# Patient Record
Sex: MALE | Race: WHITE | NOT HISPANIC OR LATINO | Employment: OTHER | URBAN - METROPOLITAN AREA
[De-identification: names, ages, dates, MRNs, and addresses within clinical notes are randomized per-mention and may not be internally consistent; named-entity substitution may affect disease eponyms.]

---

## 2024-11-29 ENCOUNTER — TELEPHONE (OUTPATIENT)
Dept: ORTHOPEDICS | Facility: CLINIC | Age: 74
End: 2024-11-29
Payer: MEDICARE

## 2024-11-29 DIAGNOSIS — S72.001D HIP FX, RIGHT, CLOSED, WITH ROUTINE HEALING, SUBSEQUENT ENCOUNTER: Primary | ICD-10-CM

## 2024-11-29 DIAGNOSIS — M25.561 RIGHT KNEE PAIN, UNSPECIFIED CHRONICITY: ICD-10-CM

## 2024-11-29 NOTE — TELEPHONE ENCOUNTER
----- Message from Laura sent at 11/27/2024  4:30 PM CST -----  Type:  Needs Medical Advice    Who Called: KADE WASHINGTON [[id number]]  Symptoms (please be specific):    How long has patient had these symptoms:    Pharmacy name and phone #:    Would the patient rather a call back or a response via [facility]?   Best Call Back Number:  [phone]  Additional Information: Patient called in regards to appt schedule on 12- that was cancelled and appt reschedule for 12-04. Patient states 12-04 doesn't work for patient, he'll be out of town. Patient Will like to be  rescheduled after 12-10. Please call before scheduling

## 2024-11-29 NOTE — TELEPHONE ENCOUNTER
----- Message from Kiah sent at 11/29/2024 11:30 AM CST -----  Contact: Leroy  Type:  Patient Returning Call    Who Called:Leroy  Who Left Message for Patient:Nurse  Does the patient know what this is regarding?:rescheduling  Would the patient rather a call back or a response via [facility]? call  Best Call Back Number:[phone]    Additional Information:

## 2025-01-03 DIAGNOSIS — M25.561 RIGHT KNEE PAIN, UNSPECIFIED CHRONICITY: Primary | ICD-10-CM

## 2025-01-07 ENCOUNTER — HOSPITAL ENCOUNTER (OUTPATIENT)
Dept: RADIOLOGY | Facility: HOSPITAL | Age: 75
Discharge: HOME | End: 2025-01-07
Attending: ORTHOPAEDIC SURGERY
Payer: MEDICARE

## 2025-01-07 ENCOUNTER — OFFICE VISIT (OUTPATIENT)
Dept: ORTHOPEDICS | Facility: CLINIC | Age: 75
End: 2025-01-07
Payer: MEDICARE

## 2025-01-07 VITALS — BODY MASS INDEX: 20.88 KG/M2 | HEIGHT: 67 IN | WEIGHT: 133 LBS

## 2025-01-07 DIAGNOSIS — S72.001D CLOSED FRACTURE OF NECK OF RIGHT FEMUR WITH ROUTINE HEALING, SUBSEQUENT ENCOUNTER: ICD-10-CM

## 2025-01-07 DIAGNOSIS — M25.561 RIGHT KNEE PAIN, UNSPECIFIED CHRONICITY: ICD-10-CM

## 2025-01-07 DIAGNOSIS — S72.001D HIP FX, RIGHT, CLOSED, WITH ROUTINE HEALING, SUBSEQUENT ENCOUNTER: ICD-10-CM

## 2025-01-07 DIAGNOSIS — M17.11 PRIMARY OSTEOARTHRITIS OF RIGHT KNEE: Primary | ICD-10-CM

## 2025-01-07 PROCEDURE — 99213 OFFICE O/P EST LOW 20 MIN: CPT | Mod: S$PBB,,, | Performed by: ORTHOPAEDIC SURGERY

## 2025-01-07 PROCEDURE — 73502 X-RAY EXAM HIP UNI 2-3 VIEWS: CPT | Mod: TC,RT

## 2025-01-07 PROCEDURE — 99999 PR PBB SHADOW E&M-EST. PATIENT-LVL III: CPT | Mod: PBBFAC,,, | Performed by: ORTHOPAEDIC SURGERY

## 2025-01-07 PROCEDURE — 73502 X-RAY EXAM HIP UNI 2-3 VIEWS: CPT | Mod: 26,RT,, | Performed by: RADIOLOGY

## 2025-01-07 PROCEDURE — 99213 OFFICE O/P EST LOW 20 MIN: CPT | Mod: PBBFAC,25 | Performed by: ORTHOPAEDIC SURGERY

## 2025-01-07 PROCEDURE — 73564 X-RAY EXAM KNEE 4 OR MORE: CPT | Mod: TC,RT

## 2025-01-07 PROCEDURE — 73564 X-RAY EXAM KNEE 4 OR MORE: CPT | Mod: 26,RT,, | Performed by: RADIOLOGY

## 2025-01-07 RX ORDER — ERGOCALCIFEROL 1.25 MG/1
50000 CAPSULE ORAL
COMMUNITY

## 2025-01-07 RX ORDER — VITAMIN B COMPLEX
1 CAPSULE ORAL DAILY
COMMUNITY

## 2025-01-07 RX ORDER — LEVOTHYROXINE SODIUM 125 UG/1
125 TABLET ORAL EVERY MORNING
COMMUNITY

## 2025-01-07 NOTE — PROGRESS NOTES
Subjective :  Functionally doing well after healing from right hip fracture April 20, 2024    Patient ID: Leroy Melton is a 74 y.o. male.    Chief Complaint: Pain of the Right Hip      HPI:  The patient is here for follow up.  He had surgery April 25, 2024 at [facility] for a femoral neck fracture, with percutaneous screw fixation.  Three cannulated Synthes screws.  He has gotten much better since his last visit October 20, 2024 at [facility].  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  He got rid of the crutch shortly after his last visit in October.  His knee pain it was hurting and therapy has subsided completely.  He reports an 8/10 pain in the posterior buttock when trying to sleep at night or when bending over.  He has decreased range of motion of the hip and difficulty putting on his socks.  Otherwise he ambulates well with no pain and is back working 100%.    REVIEW OF SYSTEMS:  No chills sweats fevers chest pain or shortness breath       Objective     PHYSICAL EXAMINATION:  Thin  gentleman with a large cowboy hat and belt buckle.  Walking well.  Decreased rotational motion of the right hip.  Unable to reach the floor bending forward with his right hand due to hip stiffness.  No groin pain on the right with a rotational motion or with walking and standing.  Posterior trochanteric pain and gluteal tendon pain to direct palpation.  No palpation or screw head on the lateral femoral trochanter.  Right knee without pain or swelling.  Full range of motion of the right knee.  Well-healed lateral hip incision.      XRAY:  Right hip three views  Percutaneous screw fixation right hip femoral neck fracture.  No significant avascular necrosis of the femoral head.  Fracture line is consolidating and appears to be healing.  Slight backing out of the screw heads similar to previous x-rays from [facility] a few  months ago.       Assessment and Plan     Patient Instructions     Encounter Diagnoses   Name Primary?    Primary osteoarthritis of right knee Yes    Closed fracture of neck of right femur with routine healing, subsequent encounter        ASSESSMENT:  Healed femoral neck fracture right hip  No mechanical pain when walking.  Posterior buttock pain mostly when sitting and sleeping.2    PLAN:  Activities as tolerated.    FOLLOW UP:  Recheck office PRN, if he has any increased groin pain he should check back to the office.      Total time with the patient today 20 minutes         No follow-ups on file.

## 2025-01-07 NOTE — PATIENT INSTRUCTIONS
Encounter Diagnoses   Name Primary?    Primary osteoarthritis of right knee Yes    Closed fracture of neck of right femur with routine healing, subsequent encounter        ASSESSMENT:  Healed femoral neck fracture right hip  No mechanical pain when walking.  Posterior buttock pain mostly when sitting and sleeping.2    PLAN:  Activities as tolerated.    FOLLOW UP:  Recheck office PRN, if he has any increased groin pain he should check back to the office.